# Patient Record
Sex: MALE | Race: WHITE | Employment: OTHER | URBAN - METROPOLITAN AREA
[De-identification: names, ages, dates, MRNs, and addresses within clinical notes are randomized per-mention and may not be internally consistent; named-entity substitution may affect disease eponyms.]

---

## 2019-01-30 ENCOUNTER — HOSPITAL ENCOUNTER (OUTPATIENT)
Dept: WOMENS IMAGING | Age: 79
Discharge: HOME | End: 2019-02-01
Payer: MEDICARE

## 2019-01-30 DIAGNOSIS — M85.80 SENILE OSTEOPENIA: ICD-10-CM

## 2019-01-30 PROCEDURE — 77080 DXA BONE DENSITY AXIAL: CPT

## 2021-10-06 ENCOUNTER — HOSPITAL ENCOUNTER (OUTPATIENT)
Dept: LAB | Age: 81
Discharge: HOME | End: 2021-10-06
Payer: MEDICARE

## 2021-10-06 LAB
ALBUMIN SERPL-MCNC: 4.2 G/DL (ref 3.5–4.6)
ALP BLD-CCNC: 64 U/L (ref 35–104)
ALT SERPL-CCNC: 18 U/L (ref 0–41)
ANION GAP SERPL CALCULATED.3IONS-SCNC: 8 MEQ/L (ref 9–15)
AST SERPL-CCNC: 25 U/L (ref 0–40)
BILIRUB SERPL-MCNC: 0.7 MG/DL (ref 0.2–0.7)
BILIRUBIN DIRECT: <0.2 MG/DL (ref 0–0.4)
BILIRUBIN, INDIRECT: NORMAL MG/DL (ref 0–0.6)
BUN BLDV-MCNC: 22 MG/DL (ref 8–23)
CALCIUM SERPL-MCNC: 9.3 MG/DL (ref 8.5–9.9)
CHLORIDE BLD-SCNC: 100 MEQ/L (ref 95–107)
CHOLESTEROL, TOTAL: 123 MG/DL (ref 0–199)
CO2: 29 MEQ/L (ref 20–31)
CREAT SERPL-MCNC: 0.97 MG/DL (ref 0.7–1.2)
GFR AFRICAN AMERICAN: >60
GFR NON-AFRICAN AMERICAN: >60
GLUCOSE BLD-MCNC: 82 MG/DL (ref 70–99)
HDLC SERPL-MCNC: 41 MG/DL (ref 40–59)
LDL CHOLESTEROL CALCULATED: 73 MG/DL (ref 0–129)
POTASSIUM SERPL-SCNC: 5 MEQ/L (ref 3.4–4.9)
SODIUM BLD-SCNC: 137 MEQ/L (ref 135–144)
TOTAL PROTEIN: 7.6 G/DL (ref 6.3–8)
TRIGL SERPL-MCNC: 46 MG/DL (ref 0–150)

## 2021-10-06 PROCEDURE — 80076 HEPATIC FUNCTION PANEL: CPT

## 2021-10-06 PROCEDURE — 80061 LIPID PANEL: CPT

## 2021-10-06 PROCEDURE — 80048 BASIC METABOLIC PNL TOTAL CA: CPT

## 2021-10-06 PROCEDURE — 36415 COLL VENOUS BLD VENIPUNCTURE: CPT

## 2023-09-04 PROBLEM — I48.91 ATRIAL FIBRILLATION (MULTI): Status: ACTIVE | Noted: 2023-09-04

## 2023-09-04 PROBLEM — S42.413D: Status: ACTIVE | Noted: 2023-09-04

## 2023-09-04 PROBLEM — Z87.891 HISTORY OF NICOTINE DEPENDENCE: Status: ACTIVE | Noted: 2023-09-04

## 2023-09-04 PROBLEM — S72.90XA FEMUR FRACTURE (MULTI): Status: ACTIVE | Noted: 2023-09-04

## 2023-09-04 PROBLEM — S42.413A CLOSED SUPRACONDYLAR FRACTURE OF HUMERUS: Status: ACTIVE | Noted: 2023-09-04

## 2023-09-04 PROBLEM — Z04.9 CONDITION NOT FOUND: Status: ACTIVE | Noted: 2023-09-04

## 2023-09-04 PROBLEM — E78.5 HYPERLIPIDEMIA: Status: ACTIVE | Noted: 2023-09-04

## 2023-09-04 PROBLEM — S42.213A FX HUMERAL NECK: Status: ACTIVE | Noted: 2023-09-04

## 2023-09-04 PROBLEM — S72.23XA: Status: ACTIVE | Noted: 2023-09-04

## 2023-09-04 PROBLEM — Z79.899 HIGH RISK MEDICATION USE: Status: ACTIVE | Noted: 2023-09-04

## 2023-09-04 PROBLEM — S42.209A DISPLACED FRACTURE OF PROXIMAL END OF HUMERUS: Status: ACTIVE | Noted: 2023-09-04

## 2023-09-04 PROBLEM — S42.409A: Status: ACTIVE | Noted: 2023-09-04

## 2023-09-04 PROBLEM — S42.309D: Status: ACTIVE | Noted: 2023-09-04

## 2023-09-04 PROBLEM — I07.1 TRICUSPID REGURGITATION: Status: ACTIVE | Noted: 2023-09-04

## 2023-09-04 PROBLEM — Z79.01 ANTICOAGULANT LONG-TERM USE: Status: ACTIVE | Noted: 2023-09-04

## 2023-09-04 PROBLEM — S72.22XA CLOSED DISPLACED SUBTROCHANTERIC FRACTURE OF LEFT FEMUR (MULTI): Status: ACTIVE | Noted: 2023-09-04

## 2023-09-04 PROBLEM — I10 HYPERTENSION: Status: ACTIVE | Noted: 2023-09-04

## 2023-09-04 RX ORDER — DILTIAZEM HYDROCHLORIDE 180 MG/1
180 CAPSULE, COATED, EXTENDED RELEASE ORAL EVERY MORNING
COMMUNITY
Start: 2019-11-05 | End: 2023-10-10 | Stop reason: SDUPTHER

## 2023-09-04 RX ORDER — CHOLECALCIFEROL (VITAMIN D3) 50 MCG
1 TABLET ORAL DAILY
COMMUNITY

## 2023-09-04 RX ORDER — PAROXETINE HYDROCHLORIDE 20 MG/1
1 TABLET, FILM COATED ORAL DAILY
COMMUNITY
Start: 2019-07-10

## 2023-09-04 RX ORDER — ACETAMINOPHEN 325 MG/1
650 TABLET ORAL EVERY 6 HOURS PRN
COMMUNITY
Start: 2019-12-11

## 2023-09-04 RX ORDER — DILTIAZEM HYDROCHLORIDE 120 MG/1
120 CAPSULE, COATED, EXTENDED RELEASE ORAL EVERY EVENING
COMMUNITY
Start: 2019-11-05 | End: 2023-10-10 | Stop reason: SDUPTHER

## 2023-09-04 RX ORDER — MULTIVITAMIN
1 TABLET ORAL DAILY
COMMUNITY

## 2023-09-04 RX ORDER — DOCUSATE SODIUM 100 MG/1
100 CAPSULE, LIQUID FILLED ORAL 2 TIMES DAILY
COMMUNITY
Start: 2019-12-11

## 2023-09-04 RX ORDER — PAROXETINE 10 MG/1
10 TABLET, FILM COATED ORAL
COMMUNITY

## 2023-09-04 RX ORDER — GUAIFENESIN 1200 MG/1
1200 TABLET, EXTENDED RELEASE ORAL EVERY 12 HOURS
COMMUNITY
Start: 2019-12-11 | End: 2023-10-10

## 2023-09-04 RX ORDER — WARFARIN 2 MG/1
TABLET ORAL
COMMUNITY
Start: 2019-02-26

## 2023-09-04 RX ORDER — WARFARIN 3 MG/1
3 TABLET ORAL
COMMUNITY

## 2023-09-04 RX ORDER — ALBUTEROL SULFATE 0.83 MG/ML
3 SOLUTION RESPIRATORY (INHALATION) EVERY 4 HOURS PRN
COMMUNITY
Start: 2019-12-11 | End: 2023-10-10

## 2023-10-09 NOTE — PROGRESS NOTES
"Subjective   Elian Moody is a 83 y.o. male with permanent atrial fibrillation tricuspid regurgitation long-term anticoagulation with warfarin, hypertension, hyperlipidemia, presents for 9-month follow-up, accompanied by wife to the office.    Interval review of systems is negative for chest discomfort pressure tightness heaviness palpitations lightheadedness orthopnea paroxysmal nocturnal dyspnea dependent edema or claudication TIA or CVA type symptoms or bleeding diathesis    No falls, no bleeding diathesis    Objective   Visit Vitals  /56 (BP Location: Right arm, Patient Position: Sitting, BP Cuff Size: Adult)   Pulse 59            GENERAL APPEARANCE: Well developed, well nourished, in no acute distress.  CHEST: Symmetric and non-tender.  INTEGUMENT: Skin warm and dry, without gross excoriationis or lesions.  HEENT: No gross abnormalities of conjunctiva, teeth, gums, oral mucosa  NECK: Supple, no JVD, no bruit. Thyroid not palpable. Carotid upstrokes normal.  NEURO/PSHCY: Alert and oriented x3; appropriate behavior and responses and responses, with normal balance and coordination  LUNGS: Clear to auscultation bilaterally; normal respiratory effort.  HEART: IRRR, the second heart sound appears split, and is accentuated  There is grade 2/6 crescendo decrescendo murmur along the left sternal border same murmur is transmitted to the carotid areas.  ABDOMEN: Soft, nontender, no palpable hepatosplenomegaly, no mases, no bruits. Abdominal aorta not noted to be enlarged.  MUSCULOSKELETAL: Ambulatory with normal tandem gait.  EXTREMITIES: Warm with good color, no clubbing or cyanois. There is no edema noted.  PERIPHERAL VASCULAR: Pulses present and equally palpable; 2+ throughout.     LABS:   CBC: No results for input(s): \"WBC\", \"HGB\", \"PLT\" in the last 72 hours.   BMP:  No results for input(s): \"NA\", \"K\", \"CL\", \"CO2\", \"BUN\", \"CREATININE\", \"GLUCOSE\" in the last 72 hours.                BNP: No results for " "input(s): \"PROBNP\" in the last 72 hours.   Mg: @LABRCNT (mg)@               No results found for: \"CHOL\"             No results found for: \"HDL\"             No results found for: \"LDLCALC\"             No results found for: \"TRIG\"             No components found for: \"CHOLHDL\"                No results found for this or any previous visit (from the past 4464 hour(s)).              No results found for this or any previous visit.              The ASCVD Risk score (Anabella ZAVALETA, et al., 2019) failed to calculate for the following reasons:    The 2019 ASCVD risk score is only valid for ages 40 to 79                           Reviewed laboratory data to include CBC comprehensive profile and lipid profile, results are acceptable.,  These labs were from September 2023.  Assessment:      1. Permanent atrial fibrillation asymptomatic, on rate control strategy  2. High risk medication warfarin-compliant, PT/INR followed by Coumadin clinic, no bleeding diathesis  3. Hypertension-at target  4. Echocardiogram December 2019 LVEF 60 to 65% left atrium 5 cm impaired diastolic filling, moderate to severe tricuspid regurgitation, RV systolic pressure 35 mmHg.  5. Hyperlipidemia-on statin therapy  6. 1 mechanical fall May 2022 with resulting in injury to the left shoulder and left elbow      Recommendations :     Patient will continue current medical therapy, see me in follow-up in 1 year.  Warfarin levels are monitored through the Coumadin clinic  Comprehensive profile and lipid profile and CBC prior to next visit    Medications refilled.    "

## 2023-10-10 ENCOUNTER — OFFICE VISIT (OUTPATIENT)
Dept: CARDIOLOGY | Facility: CLINIC | Age: 83
End: 2023-10-10
Payer: MEDICARE

## 2023-10-10 VITALS
DIASTOLIC BLOOD PRESSURE: 56 MMHG | SYSTOLIC BLOOD PRESSURE: 128 MMHG | WEIGHT: 177 LBS | BODY MASS INDEX: 23.35 KG/M2 | HEART RATE: 59 BPM

## 2023-10-10 DIAGNOSIS — Z79.01 ANTICOAGULANT LONG-TERM USE: ICD-10-CM

## 2023-10-10 DIAGNOSIS — I36.1 NONRHEUMATIC TRICUSPID VALVE REGURGITATION: ICD-10-CM

## 2023-10-10 DIAGNOSIS — I10 PRIMARY HYPERTENSION: ICD-10-CM

## 2023-10-10 DIAGNOSIS — E78.2 MIXED HYPERLIPIDEMIA: ICD-10-CM

## 2023-10-10 DIAGNOSIS — Z79.899 HIGH RISK MEDICATION USE: Primary | ICD-10-CM

## 2023-10-10 DIAGNOSIS — I48.21 PERMANENT ATRIAL FIBRILLATION (MULTI): ICD-10-CM

## 2023-10-10 PROCEDURE — 3078F DIAST BP <80 MM HG: CPT | Performed by: INTERNAL MEDICINE

## 2023-10-10 PROCEDURE — 1159F MED LIST DOCD IN RCRD: CPT | Performed by: INTERNAL MEDICINE

## 2023-10-10 PROCEDURE — 3074F SYST BP LT 130 MM HG: CPT | Performed by: INTERNAL MEDICINE

## 2023-10-10 PROCEDURE — 99214 OFFICE O/P EST MOD 30 MIN: CPT | Performed by: INTERNAL MEDICINE

## 2023-10-10 PROCEDURE — 1160F RVW MEDS BY RX/DR IN RCRD: CPT | Performed by: INTERNAL MEDICINE

## 2023-10-10 PROCEDURE — 1036F TOBACCO NON-USER: CPT | Performed by: INTERNAL MEDICINE

## 2023-10-10 RX ORDER — DILTIAZEM HYDROCHLORIDE 180 MG/1
180 CAPSULE, COATED, EXTENDED RELEASE ORAL EVERY MORNING
Qty: 90 CAPSULE | Refills: 3 | Status: SHIPPED | OUTPATIENT
Start: 2023-10-10

## 2023-10-10 RX ORDER — DILTIAZEM HYDROCHLORIDE 120 MG/1
120 CAPSULE, COATED, EXTENDED RELEASE ORAL EVERY EVENING
Qty: 90 CAPSULE | Refills: 3 | Status: SHIPPED | OUTPATIENT
Start: 2023-10-10

## 2023-10-10 ASSESSMENT — ENCOUNTER SYMPTOMS
DEPRESSION: 0
LOSS OF SENSATION IN FEET: 0
OCCASIONAL FEELINGS OF UNSTEADINESS: 0

## 2023-11-03 DIAGNOSIS — Z79.01 ANTICOAGULANT LONG-TERM USE: ICD-10-CM

## 2023-11-03 DIAGNOSIS — I36.1 NONRHEUMATIC TRICUSPID VALVE REGURGITATION: ICD-10-CM

## 2023-11-03 DIAGNOSIS — I48.21 PERMANENT ATRIAL FIBRILLATION (MULTI): ICD-10-CM

## 2023-11-03 DIAGNOSIS — Z79.899 HIGH RISK MEDICATION USE: ICD-10-CM

## 2023-11-03 DIAGNOSIS — E78.2 MIXED HYPERLIPIDEMIA: ICD-10-CM

## 2023-11-03 DIAGNOSIS — I10 PRIMARY HYPERTENSION: ICD-10-CM

## 2023-11-14 RX ORDER — LOVASTATIN 20 MG/1
20 TABLET ORAL
Qty: 90 TABLET | Refills: 3 | Status: SHIPPED | OUTPATIENT
Start: 2023-11-14 | End: 2024-11-13

## 2024-02-26 NOTE — TELEPHONE ENCOUNTER
Script for Lovastatin denied by insurance. Was escribed as Lovastatin (Altoprev) which is not the correct formulary.  Script resent as Lovastatin (Mevacor) 20 mg every night.     Pt also had question regarding Diltiazem. I advised pt and wife who were here in office that script for both Diltiazem 120 and 180 mg tablets was authorized on 10/10/23 and should be at pharmacy.    Called pharmacy and both scripts were picked up 9/20/23 for a 90 day supply and to soon to fill. Next fill date 11/26/23.   
no

## 2024-03-26 ENCOUNTER — TELEPHONE (OUTPATIENT)
Dept: CARDIOLOGY | Facility: CLINIC | Age: 84
End: 2024-03-26
Payer: MEDICARE

## 2024-03-26 NOTE — TELEPHONE ENCOUNTER
Carolyn from pre Admission testing would like to know if pt can hold Warfarin 5 days prior to scheduled procedure on 04/15/2024. Luis

## 2024-09-10 ENCOUNTER — APPOINTMENT (OUTPATIENT)
Dept: CARDIOLOGY | Facility: CLINIC | Age: 84
End: 2024-09-10
Payer: MEDICARE

## 2024-10-10 ENCOUNTER — APPOINTMENT (OUTPATIENT)
Dept: CARDIOLOGY | Facility: CLINIC | Age: 84
End: 2024-10-10
Payer: MEDICARE